# Patient Record
Sex: MALE | Race: WHITE
[De-identification: names, ages, dates, MRNs, and addresses within clinical notes are randomized per-mention and may not be internally consistent; named-entity substitution may affect disease eponyms.]

---

## 2017-03-14 ENCOUNTER — HOSPITAL ENCOUNTER (OUTPATIENT)
Dept: HOSPITAL 59 - HOP | Age: 77
Discharge: HOME | End: 2017-03-14
Attending: UROLOGY
Payer: MEDICARE

## 2017-03-14 DIAGNOSIS — N39.41: Primary | ICD-10-CM

## 2017-03-24 NOTE — OPERATIVE NOTE
DATE OF PROCEDURE:  03/14/2017.



PREOPERATIVE DIAGNOSIS:

URGE INCONTINENCE.



POSTOPERATIVE DIAGNOSIS:

URGE INCONTINENCE AND ENLARGED PROSTATE WITH SYMPTOMS.



PROCEDURE:  Cystoscopy.



ANESTHESIA:  Local.



INDICATIONS:  This is a 77-year-old male with the above symptoms who presented 
for cystoscopic evaluation.    



PROCEDURE:  Preoperative informed consent was obtained.  Antibiotics were 
given.  The patient was brought to the procedure room at [facility] and placed supine.  The genitalia were prepped and draped sterilely, and 
the flexible cystoscopy was performed.  The urethra appeared unremarkable to 
the level of the prostatic urethra.  This showed significant middle lobe 
obstruction as well as moderate lateral lobe obstruction.  The bladder was 
entered and inspected systematically.  There was moderate bladder trabeculation 
noted.  Both ureteral orifices had normal appearance and positioning.  The 
bladder otherwise appeared unremarkable with no evidence of mass, calculus, 
foreign body, or mucosal abnormality.



The scope was then withdrawn and the procedure was terminated.  The patient 
tolerated it quite well.



PLAN:  We discussed management options regarding the urinary symptoms which 
appear to be caused by obstructive benign prostatic hypertrophy.  We discussed 
medical therapy which apparently he has tried in the past but currently is not 
taking due to inefficacy.  We further discussed transurethral resection of the 
prostate, laser vaporization of the prostate, microwave therapy, and various 
office-based procedures.  Given the appearance of middle lobe and, to a lesser 
degree, lateral lobe obstruction, I have recommended that we move ahead with 
green light photovaporization of the prostate.  The procedure was discussed in 
detail including the potential risks of incontinence, retention, bleeding, 
infection, and iatrogenic injury.  He would like to proceed as soon as possible.







_______________________________      ________________________________

Peter Clemons M.D.         Date      Time



Job Number:  



cc:  JASON Rojas

## 2018-01-02 ENCOUNTER — HOSPITAL ENCOUNTER (EMERGENCY)
Dept: HOSPITAL 59 - ER | Age: 78
Discharge: ANOTHER HOSPITAL | End: 2018-01-02
Payer: MEDICARE

## 2018-01-02 DIAGNOSIS — J44.9: ICD-10-CM

## 2018-01-02 DIAGNOSIS — E11.9: ICD-10-CM

## 2018-01-02 DIAGNOSIS — I20.0: Primary | ICD-10-CM

## 2018-01-02 DIAGNOSIS — Z87.891: ICD-10-CM

## 2018-01-02 LAB
ANION GAP SERPL CALC-SCNC: 11 MMOL/L (ref 7–16)
BASOPHILS NFR BLD: 0.3 % (ref 0–6)
BUN SERPL-MCNC: 19 MG/DL (ref 8–23)
CK MB SERPL-MCNC: 2.7 NG/ML (ref ?–6.73)
CO2 SERPL-SCNC: 25 MMOL/L (ref 22–29)
CREAT SERPL-MCNC: 1 MG/DL (ref 0.7–1.2)
CREATINE PHOSPHOKINASE: 75 U/L (ref 39–308)
EOSINOPHIL NFR BLD: 3.3 % (ref 0–6)
ERYTHROCYTE [DISTWIDTH] IN BLOOD BY AUTOMATED COUNT: 13.6 % (ref 11.5–14.5)
EST GLOMERULAR FILTRATION RATE: > 60 ML/MIN
GLUCOSE SERPL-MCNC: 224 MG/DL (ref 74–109)
GRANULOCYTES NFR BLD: 67.5 % (ref 47–80)
HCT VFR BLD CALC: 39.1 % (ref 42–52)
HGB BLD-MCNC: 12.7 GM/DL (ref 14–18)
LYMPHOCYTES NFR BLD AUTO: 18.4 % (ref 16–45)
MCH RBC QN AUTO: 30 PG (ref 27–33)
MCHC RBC AUTO-ENTMCNC: 32.5 G/DL (ref 32–36)
MCV RBC AUTO: 92.7 FL (ref 81–97)
MONOCYTES NFR BLD: 10.5 % (ref 0–9)
PLATELET # BLD: 285 K/UL (ref 130–400)
PMV BLD AUTO: 9.7 FL (ref 7.4–10.4)
RBC # BLD AUTO: 4.22 M/UL (ref 4.4–5.7)
WBC # BLD AUTO: 6.7 K/UL (ref 4.2–12.2)

## 2018-01-02 PROCEDURE — 82550 ASSAY OF CK (CPK): CPT

## 2018-01-02 PROCEDURE — 93010 ELECTROCARDIOGRAM REPORT: CPT

## 2018-01-02 PROCEDURE — 99285 EMERGENCY DEPT VISIT HI MDM: CPT

## 2018-01-02 PROCEDURE — 71020: CPT

## 2018-01-02 PROCEDURE — 85025 COMPLETE CBC W/AUTO DIFF WBC: CPT

## 2018-01-02 PROCEDURE — 93005 ELECTROCARDIOGRAM TRACING: CPT

## 2018-01-02 PROCEDURE — 84484 ASSAY OF TROPONIN QUANT: CPT

## 2018-01-02 PROCEDURE — 82553 CREATINE MB FRACTION: CPT

## 2018-01-02 PROCEDURE — 80048 BASIC METABOLIC PNL TOTAL CA: CPT

## 2018-01-02 NOTE — EMERGENCY DEPARTMENT RECORD
History of Present Illness





- General


Chief Complaint: Chest Pain


Stated Complaint: CHEST PAIN


Time Seen by Provider: 18 14:03


Source: Patient


Mode of Arrival: Wheelchair


Limitations: No limitations





- History of Present Illness


Initial Comments: 





pt has been having cp that is getting progressively worse w cold and activity.  

it gets better w rest. it radiates into his neck and l arm.  ntg helped it 

yesterday


MD Complaint: Chest pain


Onset/Timin


-: Month(s)


Onset: During rest, Other


Pain Location: Substernal


Pain Radiation: LUE, Neck


Severity scale (1-10): 3


Quality: Sharp


Consistency: Constant


Improves With: Nothing


Worsens With: Exertion


Treatments Prior to Arrival: None





- Related Data


 Home Medications











 Medication  Instructions  Recorded  Confirmed  Last Taken


 


Amlodipine Besylate/Benazepril 1 tab PO DAILY 18 Unknown





[Amlodipine-Benazepril 10-20 mg]    


 


Atenolol 25 mg PO DAILY 18 Unknown


 


Glipizide [Glucotrol] 5 mg PO DAILY 18 Unknown


 


Ipratropium/Albuterol Sulfate 1 - 2 puff IH TID 18 Unknown





[Combivent]    


 


Levothyroxine Sodium [Levoxyl] 25 mcg PO DAILY 18 Unknown


 


Multivitamin [Multiple Vitamins] 1 each PO DAILY 18 Unknown


 


Nitroglycerin 0.4 mg SL ASDIR PRN 18 Unknown


 


Pantoprazole Sodium [Protonix] 40 mg PO DAILY 18 Unknown


 


Pitavastatin Calcium [Livalo] 0.5 mg PO DAILY 18 Unknown


 


Tramadol HCl [Ultram] 50 mg PO DAILY 18 Unknown


 


Venlafaxine HCl [Effexor Xr] 50 mg PO Q48H 18 Unknown











 Allergies











Allergy/AdvReac Type Severity Reaction Status Date / Time


 


Penicillins Allergy Unknown SWELLING Unverified 17 13:45





   (GENERAL)  














Travel Screening





- Travel/Exposure Within Last 30 Days


Have you traveled within the last 30 days?: No





Review of Systems


Reviewed: No additional complaints except as noted below


Constitutional: Reports: As per HPI.  Denies: Chills, Fever, Malaise, Night 

sweats, Weakness, Weight change


Eyes: Reports: As per HPI.  Denies: Eye discharge, Eye pain, Photophobia, 

Vision change


ENT: Reports: As per HPI.  Denies: Congestion, Dental pain, Ear pain, Epistaxis

, Hearing loss, Throat pain


Respiratory: Reports: As per HPI.  Denies: Cough, Dyspnea, Hemoptysis, Stridor, 

Wheezes


Cardiovascular: Reports: As per HPI.  Denies: Arrhythmia, Chest pain, Dyspnea 

on exertion, Edema, Murmurs, Orthopnea, Palpitations, Paroxysmal nocturnal 

dyspnea, Rheumatic Fever, Syncope


Endocrine: Reports: As per HPI.  Denies: Fatigue, Heat or cold intolerance, 

Polydipsia, Polyuria


Gastrointestinal: Reports: As per HPI.  Denies: Abdominal pain, Constipation, 

Diarrhea, Hematemesis, Hematochezia, Melena, Nausea, Vomiting


Genitourinary: Reports: As per HPI.  Denies: Dysuria, Frequency, Hematuria, 

Incontinence, Retention, Testicular pain, Testicular mass, Urgency


Musculoskeletal: Reports: As per HPI.  Denies: Arthralgia, Back pain, Gout, 

Joint swelling, Myalgia, Neck pain


Skin: Reports: As per HPI.  Denies: Bruising, Change in color, Change in hair/

nails, Lesions, Pruritus, Rash


Neurological: Reports: As per HPI.  Denies: Abnormal gait, Confusion, Headache, 

Numbness, Paresthesias, Seizure, Tingling, Tremors, Vertigo, Weakness


Psychiatric: Reports: As per HPI.  Denies: Anxiety, Auditory hallucinations, 

Depression, Homicidal thoughts, Suicidal thoughts, Visual hallucinations


Hematological/Lymphatic: Reports: As per HPI.  Denies: Anemia, Blood Clots, 

Easy bleeding, Easy bruising, Swollen glands





Past Medical History





- SOCIAL HISTORY


Smoking Status: Former smoker


Alcohol Use: None


Drug Use: None





- RESPIRATORY


Hx Respiratory Disorders: Yes


Hx COPD: Yes





- CARDIOVASCULAR


Hx Cardio Disorders: Yes


Hx Chest Pain: Yes


Hx Vascular Disease: Yes


Hx Coronary Stent: Yes





- NEURO


Hx Neuro Disorders: Yes


Hx Dizziness: Yes


Hx Neuropathy: Yes (both hands)





- GI


Hx GI Disorders: Yes


Hx Abdominal Pain: Yes


Hx Nausea/Vomiting: Yes





- 


Hx Genitourinary Disorders: Yes


Hx Prostate Problems: Yes (frequency)


Hx Renal Disease: Yes (benign renal mass)





- ENDOCRINE


Hx Endocrine Disorders: Yes


Hx Diabetes: Yes





- MUSCULOSKELETAL


Hx Musculoskeletal Disorders: Yes


Hx Back Injury: Yes (fell off a latter)





- PSYCH


Hx Psych Problems: Yes


Hx Anxiety: Yes


Hx Depression: Yes





- HEMATOLOGY/ONCOLOGY


Hx Hematology/Oncology Disorders: No





Family Medical History


Any Significant Family History?: Yes


Hx Diabetes: Father


Hx Heart Disease: Mother


Hx Resp Disorders: Mother





Physical Exam





- General


General Appearance: Alert, Oriented x3, Cooperative, Mild distress





- Head


Head exam: Normal inspection





- Eye


Eye exam: Normal appearance, PERRL, EOMI


Pupils: Normal accommodation





- ENT


ENT exam: Normal exam, Mucous membranes moist, Normal external ear exam, Normal 

orophraynx


Ear exam: Normal external inspection.  negative: External canal tenderness


Nasal Exam: Normal inspection.  negative: Discharge, Sinus tenderness


Mouth exam: Normal external inspection, Tongue normal


Teeth exam: Normal inspection.  negative: Dental caries


Throat exam: Normal inspection.  negative: Tonsillar erythema, Tonsillar exudate





- Neck


Neck exam: Normal inspection, Full ROM.  negative: Tenderness





- Respiratory


Respiratory exam: Normal lung sounds bilaterally.  negative: Respiratory 

distress





- Cardiovascular


Cardiovascular Exam: Regular rate, Normal rhythm, Normal heart sounds





- GI/Abdominal


GI/Abdominal exam: Soft, Normal bowel sounds.  negative: Tenderness





- Rectal


Rectal exam: Deferred





- 


 exam: Deferred





- Extremities


Extremities exam: Normal inspection, Full ROM, Normal capillary refill.  

negative: Tenderness





- Back


Back exam: Reports: Normal inspection, Full ROM.  Denies: Muscle spasm, Rash 

noted, Tenderness





- Neurological


Neurological exam: Alert, CN II-XII intact, Normal gait, Oriented X3





- Psychiatric


Psychiatric exam: Normal affect, Normal mood





- Skin


Skin exam: Dry, Intact, Normal color, Warm





Course





 Vital Signs











  18





  13:44


 


Temperature 98.0 F


 


Pulse Rate 78


 


Respiratory 16





Rate 


 


Blood Pressure 137/94


 


Pulse Ox 97














Medical Decision Making





- Lab Data


Result diagrams: 


 18 13:50





 18 13:50





 Lab Results











  18 Range/Units





  13:50 13:50 


 


WBC  6.7   (4.2-12.2)  K/uL


 


RBC  4.22 L   (4.40-5.70)  M/uL


 


Hgb  12.7 L   (14.0-18.0)  gm/dl


 


Hct  39.1 L   (42.0-52.0)  %


 


MCV  92.7   (81-97)  fl


 


MCH  30.0   (27-33)  pg


 


MCHC  32.5   (32-36)  g/dl


 


RDW  13.6   (11.5-14.5)  %


 


Plt Count  285   (130-400)  K/uL


 


MPV  9.7   (7.4-10.4)  fl


 


Gran %  67.5   (47-80)  %


 


Lymphocytes %  18.4   (16-45)  %


 


Monocytes %  10.5 H   (0-9)  %


 


Eosinophils %  3.3   (0-6)  %


 


Basophils %  0.3   (0-6)  %


 


Sodium   137  (136-145)  mmol/L


 


Potassium   4.3  (3.4-4.5)  mmol/L


 


Chloride   101  ()  mmol/L


 


Carbon Dioxide   25.0  (22-29)  mmol/L


 


Anion Gap   11.0  (7-16)  


 


BUN   19  (8-23)  mg/dL


 


Creatinine   1.0  (0.7-1.2)  mg/dL


 


Estimated GFR   > 60  mL/min


 


Random Glucose   224 H  ()  mg/dL


 


Calcium   9.1  (8.8-10.2)  mg/dL


 


Creatine Kinase   75  ()  U/L


 


CK-MB (CK-2)   2.7  (<6.73)  ng/mL


 


Troponin T   0.023 H  (0-0.010)  ng/mL














Disposition


Disposition: Transfer


Clinical Impression: 


 Unstable angina





Disposition: Acute Care Hospital Transfer


Transfer To: [facility]


Reason For Transfer: needs cardiologist


Accepting Physician:  voice


Time Discussed w/Accepting Physician: 16:25





Quality





- Quality Measures


Quality Measures: N/A





- Blood Pressure Screening


Does Patient Have Any of the Following: No


Blood Pressure Classification: Hypertensive Reading


Systolic Measurement: 137


Diastolic Measurement: 94


Screening for High Blood Pressure: < First Hypertensive BP, F/U Documented > [

]


First Hypertensive Follow-up Interventions: Follow-up with rescreen GT 1 day 

and LT 4 weeks.

## 2018-01-02 NOTE — EMERGENCY DEPARTMENT RECORD
History of Present Illness





- General


Chief Complaint: Chest Pain


Stated Complaint: CHEST PAIN


Time Seen by Provider: 18 14:03


Source: Patient


Mode of Arrival: Wheelchair


Limitations: No limitations





- History of Present Illness


Onset/Timin


-: Month(s)


Onset: During rest, Other


Pain Location: Substernal


Pain Radiation: LUE, Neck


Severity scale (1-10): 3


Quality: Sharp


Consistency: Constant


Improves With: Nothing


Worsens With: Exertion


Treatments Prior to Arrival: None





- Related Data


 Home Medications











 Medication  Instructions  Recorded  Confirmed  Last Taken


 


Amlodipine Besylate/Benazepril 1 tab PO DAILY 18 Unknown





[Amlodipine-Benazepril 10-20 mg]    


 


Atenolol 25 mg PO DAILY 18 Unknown


 


Glipizide [Glucotrol] 5 mg PO DAILY 18 Unknown


 


Ipratropium/Albuterol Sulfate 1 - 2 puff IH TID 18 Unknown





[Combivent]    


 


Levothyroxine Sodium [Levoxyl] 25 mcg PO DAILY 18 Unknown


 


Multivitamin [Multiple Vitamins] 1 each PO DAILY 18 Unknown


 


Nitroglycerin 0.4 mg SL ASDIR PRN 18 Unknown


 


Pantoprazole Sodium [Protonix] 40 mg PO DAILY 18 Unknown


 


Pitavastatin Calcium [Livalo] 0.5 mg PO DAILY 18 Unknown


 


Tramadol HCl [Ultram] 50 mg PO DAILY 18 Unknown


 


Venlafaxine HCl [Effexor Xr] 50 mg PO Q48H 18 Unknown











 Allergies











Allergy/AdvReac Type Severity Reaction Status Date / Time


 


Penicillins Allergy Unknown SWELLING Unverified 17 13:45





   (GENERAL)  














Travel Screening





- Travel/Exposure Within Last 30 Days


Have you traveled within the last 30 days?: No





Review of Systems


Constitutional: Reports: As per HPI.  Denies: Chills, Fever, Malaise, Night 

sweats, Weakness, Weight change


Eyes: Reports: As per HPI.  Denies: Eye discharge, Eye pain, Photophobia, 

Vision change


ENT: Reports: As per HPI.  Denies: Congestion, Dental pain, Ear pain, Epistaxis

, Hearing loss, Throat pain


Respiratory: Reports: As per HPI.  Denies: Cough, Dyspnea, Hemoptysis, Stridor, 

Wheezes


Cardiovascular: Reports: As per HPI.  Denies: Arrhythmia, Chest pain, Dyspnea 

on exertion, Edema, Murmurs, Orthopnea, Palpitations, Paroxysmal nocturnal 

dyspnea, Rheumatic Fever, Syncope


Endocrine: Reports: As per HPI.  Denies: Fatigue, Heat or cold intolerance, 

Polydipsia, Polyuria


Gastrointestinal: Reports: As per HPI.  Denies: Abdominal pain, Constipation, 

Diarrhea, Hematemesis, Hematochezia, Melena, Nausea, Vomiting


Genitourinary: Reports: As per HPI.  Denies: Dysuria, Frequency, Hematuria, 

Incontinence, Retention, Testicular pain, Testicular mass, Urgency


Musculoskeletal: Reports: As per HPI.  Denies: Arthralgia, Back pain, Gout, 

Joint swelling, Myalgia, Neck pain


Skin: Reports: As per HPI.  Denies: Bruising, Change in color, Change in hair/

nails, Lesions, Pruritus, Rash


Neurological: Reports: As per HPI.  Denies: Abnormal gait, Confusion, Headache, 

Numbness, Paresthesias, Seizure, Tingling, Tremors, Vertigo, Weakness


Psychiatric: Reports: As per HPI.  Denies: Anxiety, Auditory hallucinations, 

Depression, Homicidal thoughts, Suicidal thoughts, Visual hallucinations


Hematological/Lymphatic: Reports: As per HPI.  Denies: Anemia, Blood Clots, 

Easy bleeding, Easy bruising, Swollen glands





Past Medical History





- SOCIAL HISTORY


Smoking Status: Former smoker


Alcohol Use: None


Drug Use: None





- RESPIRATORY


Hx Respiratory Disorders: Yes


Hx COPD: Yes





- CARDIOVASCULAR


Hx Cardio Disorders: Yes


Hx Chest Pain: Yes


Hx Vascular Disease: Yes


Hx Coronary Stent: Yes





- NEURO


Hx Neuro Disorders: Yes


Hx Dizziness: Yes


Hx Neuropathy: Yes (both hands)





- GI


Hx GI Disorders: Yes


Hx Abdominal Pain: Yes


Hx Nausea/Vomiting: Yes





- 


Hx Genitourinary Disorders: Yes


Hx Prostate Problems: Yes (frequency)


Hx Renal Disease: Yes (benign renal mass)





- ENDOCRINE


Hx Endocrine Disorders: Yes


Hx Diabetes: Yes





- MUSCULOSKELETAL


Hx Musculoskeletal Disorders: Yes


Hx Back Injury: Yes (fell off a latter)





- PSYCH


Hx Psych Problems: Yes


Hx Anxiety: Yes


Hx Depression: Yes





- HEMATOLOGY/ONCOLOGY


Hx Hematology/Oncology Disorders: No





Family Medical History


Any Significant Family History?: Yes


Hx Diabetes: Father


Hx Heart Disease: Mother


Hx Resp Disorders: Mother





Physical Exam





- General


Limitations: No limitations





Course





 Vital Signs











  18





  13:44 16:07 17:47


 


Temperature 98.0 F  


 


Pulse Rate 78  


 


Pulse Rate [  73 74





Cardiac Monitor   





]   


 


Respiratory 16 18 20





Rate   


 


Blood Pressure 137/94  


 


Blood Pressure  166/84 146/84





[Right Arm]   


 


Pulse Ox 97 96 97














- Reevaluation(s)


Reevaluation #1: 





18 18:32


pt has no cp now





Medical Decision Making





- Lab Data


Result diagrams: 


 18 13:50





 18 13:50





 Lab Results











  18 Range/Units





  13:50 13:50 


 


WBC  6.7   (4.2-12.2)  K/uL


 


RBC  4.22 L   (4.40-5.70)  M/uL


 


Hgb  12.7 L   (14.0-18.0)  gm/dl


 


Hct  39.1 L   (42.0-52.0)  %


 


MCV  92.7   (81-97)  fl


 


MCH  30.0   (27-33)  pg


 


MCHC  32.5   (32-36)  g/dl


 


RDW  13.6   (11.5-14.5)  %


 


Plt Count  285   (130-400)  K/uL


 


MPV  9.7   (7.4-10.4)  fl


 


Gran %  67.5   (47-80)  %


 


Lymphocytes %  18.4   (16-45)  %


 


Monocytes %  10.5 H   (0-9)  %


 


Eosinophils %  3.3   (0-6)  %


 


Basophils %  0.3   (0-6)  %


 


Sodium   137  (136-145)  mmol/L


 


Potassium   4.3  (3.4-4.5)  mmol/L


 


Chloride   101  ()  mmol/L


 


Carbon Dioxide   25.0  (22-29)  mmol/L


 


Anion Gap   11.0  (7-16)  


 


BUN   19  (8-23)  mg/dL


 


Creatinine   1.0  (0.7-1.2)  mg/dL


 


Estimated GFR   > 60  mL/min


 


Random Glucose   224 H  ()  mg/dL


 


Calcium   9.1  (8.8-10.2)  mg/dL


 


Creatine Kinase   75  ()  U/L


 


CK-MB (CK-2)   2.7  (<6.73)  ng/mL


 


Troponin T   0.023 H  (0-0.010)  ng/mL














Disposition


Clinical Impression: 


 Unstable angina





Disposition: Acute Care Hospital Transfer





Quality





- Quality Measures


Quality Measures: N/A





- Blood Pressure Screening


Does Patient Have Any of the Following: No


Blood Pressure Classification: Hypertensive Reading


Systolic Measurement: 137


Diastolic Measurement: 94


Screening for High Blood Pressure: < First Hypertensive BP, F/U Documented > [

]


First Hypertensive Follow-up Interventions: Follow-up with rescreen GT 1 day 

and LT 4 weeks.